# Patient Record
(demographics unavailable — no encounter records)

---

## 2025-04-15 NOTE — HISTORY OF PRESENT ILLNESS
[FreeTextEntry1] : Initial Visit: 4/15/2025:  ID#[id number] assisting with translation. Patient is a 50 year old male with PMHx HTN, HLD who presents for initial visit due to postconcussive symptoms. 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 Patient was taken to an outside hospital at that time where CT head revealed a L frontal bone fracture with small epidural hematoma, without evidence of intracranial hemorrhage. Patient was observed in SICU and later discharged after uncomplicated hospitalization. Patient was started on brief course of Keppra at that time which has since been discontinued. Patient was told not to return to work immediately after his accident, however was cleared to resume working 3-4 weeks ago and has been doing so without issue. Patient's chief complaint at this time is brief episodes of room-spinning dizziness which occur when he lies down in his bed as well as when he stands up in the morning. The episodes also occur when he turns/changes position in bed. Episodes are occurring on a daily basis, each episode lasts for approximately 5-10 seconds before resolving. Patient denies associated symptoms including headache, vision changes, nausea, vomiting. He denies problems with gait or balance. 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 Denies hearing loss. Patient recently underwent MRI brain on 3/21/25 which was noted for several small T2 hyperintensities, possibly in setting of migraines or TBI.

## 2025-04-15 NOTE — PHYSICAL EXAM
[FreeTextEntry1] : General: Well-appearing Neurological exam: Mental status: AAOx3, naming and repetition intact, attention and concentration intact CN: Pupils equal and reactive, visual fields full, EOMI, no nystagmus, V1-V3 intact to light touch, face symmetric, tongue midline, shoulder shrug 5/5 Motor: Normal bulk and tone, no pronator drift. 5/5 strength throughout Sensory: Intact to light touch bilaterally Reflexes: 2+ throughout Gait: Normal gait *Limited Epley maneuver attempted during the exam (unable to recline patient to flat position due to office space constraints) with reproduction of symptoms, without objective findings of nystagmus to declare position Nickie-Hallpike test.

## 2025-04-15 NOTE — ASSESSMENT
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 Denies associated symptoms during these episodes, or other complaints at this time. Patient does report chronic tinnitus d/t noisy construction sites. Exam is unremarkable with exception of symptoms reproduced with quickly lying the patient down from sitting position.  Impression: Brief episodes of positional vertigo possibly postconcussive syndrome  Recommendations: [] Vestibular therapy [] ENT referral given positional dizziness and chronic tinnitus [] RTC 6 months  Seen and discussed with attending Dr. Torrez.